# Patient Record
Sex: MALE | Race: BLACK OR AFRICAN AMERICAN | NOT HISPANIC OR LATINO | Employment: OTHER | URBAN - METROPOLITAN AREA
[De-identification: names, ages, dates, MRNs, and addresses within clinical notes are randomized per-mention and may not be internally consistent; named-entity substitution may affect disease eponyms.]

---

## 2022-09-08 PROBLEM — I42.9 CARDIOMYOPATHY: Status: ACTIVE | Noted: 2022-09-08

## 2022-09-08 PROBLEM — I1A.0 RESISTANT HYPERTENSION: Status: ACTIVE | Noted: 2022-09-08

## 2022-09-22 PROBLEM — N18.31 CHRONIC KIDNEY DISEASE, STAGE 3A: Status: ACTIVE | Noted: 2022-09-22

## 2022-10-18 PROBLEM — R55 SYNCOPE AND COLLAPSE: Status: ACTIVE | Noted: 2022-10-18

## 2022-12-01 PROBLEM — E78.5 HYPERLIPIDEMIA: Status: ACTIVE | Noted: 2022-12-01

## 2023-01-17 PROBLEM — I10 HYPERTENSION: Status: ACTIVE | Noted: 2022-09-08

## 2023-03-16 PROBLEM — F33.9 DEPRESSION, RECURRENT: Status: ACTIVE | Noted: 2023-03-16

## 2023-03-16 PROBLEM — F43.10 PTSD (POST-TRAUMATIC STRESS DISORDER): Status: ACTIVE | Noted: 2023-03-16

## 2023-03-16 PROBLEM — E66.01 SEVERE OBESITY (BMI 35.0-39.9) WITH COMORBIDITY: Status: ACTIVE | Noted: 2023-03-16

## 2023-04-27 PROBLEM — G47.33 OSA (OBSTRUCTIVE SLEEP APNEA): Status: ACTIVE | Noted: 2023-04-27

## 2023-05-02 ENCOUNTER — TELEPHONE (OUTPATIENT)
Dept: ADMINISTRATIVE | Facility: HOSPITAL | Age: 33
End: 2023-05-02

## 2023-05-02 VITALS — DIASTOLIC BLOOD PRESSURE: 87 MMHG | SYSTOLIC BLOOD PRESSURE: 136 MMHG

## 2023-05-05 ENCOUNTER — SOCIAL WORK (OUTPATIENT)
Dept: ADMINISTRATIVE | Facility: OTHER | Age: 33
End: 2023-05-05

## 2023-05-05 NOTE — PROGRESS NOTES
Sw received a consult to assist with counseling. Sw called Patient ([phone]). A voice message was left requesting he call back.    Tiffanie Carroll LCSW    [phone]

## 2023-05-09 ENCOUNTER — PATIENT MESSAGE (OUTPATIENT)
Dept: ADMINISTRATIVE | Facility: OTHER | Age: 33
End: 2023-05-09

## 2023-05-09 ENCOUNTER — SOCIAL WORK (OUTPATIENT)
Dept: ADMINISTRATIVE | Facility: OTHER | Age: 33
End: 2023-05-09

## 2023-05-09 NOTE — PROGRESS NOTES
Sw received a consult to assist with counseling. Autumn called and spoke to Patient ([phone]). He explained he is interested in therapy. However, he recently moved to [place] and would like to be seen in that area. Autumn emailed Patient the contact information of [place] therapists through the [facility]. He was encouraged to contact one to schedule an assessment.    Jessica Rodriguez [facility]   [phone]  Kwadwo Gonzales [facility], [facility]   [phone]  Gloria Muñoz [facility]   [phone]  [facility]    [phone]    Tiffanie Carroll [facility]    [phone]

## 2023-08-19 PROBLEM — R11.10 VOMITING: Status: ACTIVE | Noted: 2023-08-19

## 2023-08-19 PROBLEM — R42 DIZZINESS: Status: RESOLVED | Noted: 2023-08-19 | Resolved: 2023-08-19

## 2023-08-19 PROBLEM — M79.671 FOOT PAIN, RIGHT: Status: ACTIVE | Noted: 2023-08-19

## 2023-08-19 PROBLEM — E66.9 OBESITY, CLASS I, BMI 30-34.9: Status: RESOLVED | Noted: 2023-08-19 | Resolved: 2023-08-19

## 2023-08-19 PROBLEM — E87.6 HYPOKALEMIA: Status: ACTIVE | Noted: 2023-08-19

## 2023-08-19 PROBLEM — K21.9 GERD (GASTROESOPHAGEAL REFLUX DISEASE): Status: ACTIVE | Noted: 2023-08-19

## 2023-08-19 PROBLEM — R50.9 FEVER: Status: ACTIVE | Noted: 2023-08-19

## 2023-08-19 PROBLEM — E66.9 OBESITY, CLASS I, BMI 30-34.9: Status: ACTIVE | Noted: 2023-08-19

## 2023-08-19 PROBLEM — A41.9 SEPSIS WITHOUT ACUTE ORGAN DYSFUNCTION: Status: ACTIVE | Noted: 2023-08-19

## 2023-08-19 PROBLEM — D72.829 LEUKOCYTOSIS: Status: RESOLVED | Noted: 2023-08-19 | Resolved: 2023-08-19

## 2023-08-19 PROBLEM — R79.89 BLOOD CREATININE INCREASED COMPARED WITH PRIOR MEASUREMENT: Status: RESOLVED | Noted: 2023-08-19 | Resolved: 2023-08-19

## 2023-08-19 PROBLEM — R79.89 BLOOD CREATININE INCREASED COMPARED WITH PRIOR MEASUREMENT: Status: ACTIVE | Noted: 2023-08-19

## 2023-08-19 PROBLEM — R42 DIZZINESS: Status: ACTIVE | Noted: 2023-08-19

## 2023-08-19 PROBLEM — D72.829 LEUKOCYTOSIS: Status: ACTIVE | Noted: 2023-08-19

## 2023-08-19 PROBLEM — M25.579 ANKLE PAIN: Status: ACTIVE | Noted: 2023-08-19

## 2023-08-20 PROBLEM — D72.825 BANDEMIA: Status: ACTIVE | Noted: 2023-08-20

## 2023-08-20 PROBLEM — R51.9 ACUTE NONINTRACTABLE HEADACHE: Status: ACTIVE | Noted: 2023-08-20

## 2023-08-20 PROBLEM — R11.11 VOMITING WITHOUT NAUSEA, INTRACTABLE: Status: ACTIVE | Noted: 2023-08-19

## 2023-08-21 PROBLEM — R13.19 ESOPHAGEAL DYSPHAGIA: Status: ACTIVE | Noted: 2023-08-21

## 2023-11-20 PROBLEM — A41.9 SEPSIS WITHOUT ACUTE ORGAN DYSFUNCTION: Status: RESOLVED | Noted: 2023-08-19 | Resolved: 2023-11-20

## 2023-11-21 ENCOUNTER — PATIENT OUTREACH (OUTPATIENT)
Dept: ADMINISTRATIVE | Facility: HOSPITAL | Age: 33
End: 2023-11-21